# Patient Record
Sex: FEMALE | Race: WHITE | NOT HISPANIC OR LATINO
[De-identification: names, ages, dates, MRNs, and addresses within clinical notes are randomized per-mention and may not be internally consistent; named-entity substitution may affect disease eponyms.]

---

## 2017-07-27 ENCOUNTER — RX ONLY (RX ONLY)
Age: 57
End: 2017-07-27

## 2017-07-27 RX ORDER — TRETINOIN 1 MG/G
CREAM TOPICAL
Qty: 1 | Refills: 6 | Status: CANCELLED
Stop reason: DRUGHIGH

## 2017-07-27 RX ORDER — TRETINOIN 0.5 MG/G
CREAM TOPICAL
Qty: 1 | Refills: 6 | Status: ERX

## 2017-09-29 ENCOUNTER — APPOINTMENT (OUTPATIENT)
Dept: URBAN - METROPOLITAN AREA CLINIC 200 | Age: 57
Setting detail: DERMATOLOGY
End: 2017-10-02

## 2017-09-29 DIAGNOSIS — L57.8 OTHER SKIN CHANGES DUE TO CHRONIC EXPOSURE TO NONIONIZING RADIATION: ICD-10-CM

## 2017-09-29 PROCEDURE — 99213 OFFICE O/P EST LOW 20 MIN: CPT

## 2017-09-29 PROCEDURE — OTHER COUNSELING: OTHER

## 2017-09-29 ASSESSMENT — LOCATION DETAILED DESCRIPTION DERM: LOCATION DETAILED: RIGHT MEDIAL UPPER BACK

## 2017-09-29 ASSESSMENT — LOCATION SIMPLE DESCRIPTION DERM: LOCATION SIMPLE: RIGHT UPPER BACK

## 2017-09-29 ASSESSMENT — LOCATION ZONE DERM: LOCATION ZONE: TRUNK

## 2018-08-23 ENCOUNTER — APPOINTMENT (OUTPATIENT)
Dept: URBAN - METROPOLITAN AREA CLINIC 200 | Age: 58
Setting detail: DERMATOLOGY
End: 2018-09-07

## 2018-08-23 ENCOUNTER — RX ONLY (RX ONLY)
Age: 58
End: 2018-08-23

## 2018-08-23 DIAGNOSIS — L259 CONTACT DERMATITIS AND OTHER ECZEMA, UNSPECIFIED CAUSE: ICD-10-CM

## 2018-08-23 PROBLEM — L23.9 ALLERGIC CONTACT DERMATITIS, UNSPECIFIED CAUSE: Status: ACTIVE | Noted: 2018-08-23

## 2018-08-23 PROCEDURE — OTHER PRESCRIPTION: OTHER

## 2018-08-23 PROCEDURE — 99212 OFFICE O/P EST SF 10 MIN: CPT

## 2018-08-23 RX ORDER — METHYLPREDNISOLONE 4 MG/1
TABLET ORAL
Qty: 1 | Refills: 0

## 2018-08-23 RX ORDER — TRETINOIN 0.5 MG/G
CREAM TOPICAL
Qty: 1 | Refills: 6 | Status: ERX

## 2018-08-23 RX ORDER — TRIAMCINOLONE ACETONIDE 1 MG/G
CREAM TOPICAL
Qty: 1 | Refills: 3 | Status: ERX

## 2018-08-23 RX ORDER — TRETINOIN 0.5 MG/G
CREAM TOPICAL
Qty: 1 | Refills: 6

## 2018-08-23 ASSESSMENT — SEVERITY ASSESSMENT: SEVERITY: MILD TO MODERATE

## 2018-08-23 ASSESSMENT — LOCATION DETAILED DESCRIPTION DERM: LOCATION DETAILED: RIGHT INFERIOR TEMPLE

## 2018-08-23 ASSESSMENT — LOCATION ZONE DERM: LOCATION ZONE: FACE

## 2018-08-23 ASSESSMENT — LOCATION SIMPLE DESCRIPTION DERM: LOCATION SIMPLE: RIGHT TEMPLE

## 2018-10-04 ENCOUNTER — APPOINTMENT (OUTPATIENT)
Dept: URBAN - METROPOLITAN AREA CLINIC 200 | Age: 58
Setting detail: DERMATOLOGY
End: 2018-10-08

## 2018-10-04 DIAGNOSIS — L72.0 EPIDERMAL CYST: ICD-10-CM

## 2018-10-04 DIAGNOSIS — L57.8 OTHER SKIN CHANGES DUE TO CHRONIC EXPOSURE TO NONIONIZING RADIATION: ICD-10-CM

## 2018-10-04 PROCEDURE — OTHER REASSURANCE: OTHER

## 2018-10-04 PROCEDURE — OTHER COUNSELING: OTHER

## 2018-10-04 PROCEDURE — 99213 OFFICE O/P EST LOW 20 MIN: CPT

## 2018-10-04 ASSESSMENT — LOCATION SIMPLE DESCRIPTION DERM
LOCATION SIMPLE: CHEST
LOCATION SIMPLE: RIGHT BREAST

## 2018-10-04 ASSESSMENT — LOCATION DETAILED DESCRIPTION DERM
LOCATION DETAILED: LEFT MEDIAL SUPERIOR CHEST
LOCATION DETAILED: RIGHT INFRAMAMMARY CREASE (OUTER QUADRANT)

## 2018-10-04 ASSESSMENT — LOCATION ZONE DERM: LOCATION ZONE: TRUNK

## 2019-05-01 ENCOUNTER — RX ONLY (RX ONLY)
Age: 59
End: 2019-05-01

## 2019-05-01 RX ORDER — TRETINOIN 0.5 MG/G
CREAM TOPICAL
Qty: 1 | Refills: 6 | Status: ERX | COMMUNITY
Start: 2019-05-01

## 2019-05-31 ENCOUNTER — APPOINTMENT (OUTPATIENT)
Dept: URBAN - METROPOLITAN AREA CLINIC 200 | Age: 59
Setting detail: DERMATOLOGY
End: 2019-06-19

## 2019-05-31 DIAGNOSIS — L72.0 EPIDERMAL CYST: ICD-10-CM

## 2019-05-31 PROBLEM — L20.84 INTRINSIC (ALLERGIC) ECZEMA: Status: ACTIVE | Noted: 2019-05-31

## 2019-05-31 PROBLEM — L57.0 ACTINIC KERATOSIS: Status: ACTIVE | Noted: 2019-05-31

## 2019-05-31 PROCEDURE — OTHER INCISION AND DRAINAGE: OTHER

## 2019-05-31 PROCEDURE — 10060 I&D ABSCESS SIMPLE/SINGLE: CPT

## 2019-05-31 ASSESSMENT — LOCATION ZONE DERM: LOCATION ZONE: LEG

## 2019-05-31 ASSESSMENT — LOCATION SIMPLE DESCRIPTION DERM: LOCATION SIMPLE: LEFT THIGH

## 2019-05-31 ASSESSMENT — LOCATION DETAILED DESCRIPTION DERM: LOCATION DETAILED: LEFT ANTERIOR PROXIMAL THIGH

## 2019-05-31 NOTE — PROCEDURE: INCISION AND DRAINAGE
Suture Text: The incision was partially closed with
Wound Care: Vaseline
Render Postcare In Note?: No
Consent was obtained and risks were reviewed including but not limited to delayed wound healing, infection, need for multiple I and D's, and pain.
Lesion Type: Cyst
Post-Care Instructions: I reviewed with the patient in detail post-care instructions. Patient should keep wound covered and call the office should any redness, pain, swelling or worsening occur.
Preparation Text: The area was prepped in the usual clean fashion.
Epidermal Closure: simple interrupted
Detail Level: Detailed
Drainage Type?: cyst-like
Drainage Amount?: moderate
Epidermal Sutures: 5-0 Nylon
Size Of Lesion In Cm (Optional But May Be Required For Some Insurances): 0
Anesthesia Type: 1% lidocaine with 1:300,000 epinephrine
Dressing: Band-Aid
Anesthesia Volume In Cc: 0.1
Method: sterile needle

## 2019-10-10 ENCOUNTER — APPOINTMENT (OUTPATIENT)
Dept: URBAN - METROPOLITAN AREA CLINIC 200 | Age: 59
Setting detail: DERMATOLOGY
End: 2019-10-20

## 2019-10-10 DIAGNOSIS — L57.8 OTHER SKIN CHANGES DUE TO CHRONIC EXPOSURE TO NONIONIZING RADIATION: ICD-10-CM

## 2019-10-10 DIAGNOSIS — Z80.8 FAMILY HISTORY OF MALIGNANT NEOPLASM OF OTHER ORGANS OR SYSTEMS: ICD-10-CM

## 2019-10-10 DIAGNOSIS — L72.8 OTHER FOLLICULAR CYSTS OF THE SKIN AND SUBCUTANEOUS TISSUE: ICD-10-CM

## 2019-10-10 PROBLEM — D23.5 OTHER BENIGN NEOPLASM OF SKIN OF TRUNK: Status: ACTIVE | Noted: 2019-10-10

## 2019-10-10 PROCEDURE — OTHER MIPS QUALITY: OTHER

## 2019-10-10 PROCEDURE — 99213 OFFICE O/P EST LOW 20 MIN: CPT

## 2019-10-10 PROCEDURE — OTHER REASSURANCE: OTHER

## 2019-10-10 PROCEDURE — OTHER COUNSELING: OTHER

## 2019-10-10 ASSESSMENT — LOCATION DETAILED DESCRIPTION DERM
LOCATION DETAILED: LEFT MEDIAL SUPERIOR CHEST
LOCATION DETAILED: RIGHT RIB CAGE
LOCATION DETAILED: LEFT INFERIOR ANTERIOR NECK

## 2019-10-10 ASSESSMENT — LOCATION ZONE DERM
LOCATION ZONE: NECK
LOCATION ZONE: TRUNK

## 2019-10-10 ASSESSMENT — LOCATION SIMPLE DESCRIPTION DERM
LOCATION SIMPLE: CHEST
LOCATION SIMPLE: ABDOMEN
LOCATION SIMPLE: LEFT ANTERIOR NECK

## 2019-10-10 NOTE — PROCEDURE: MIPS QUALITY
Quality 110: Preventive Care And Screening: Influenza Immunization: Influenza Immunization not Administered for Documented Reasons.
Detail Level: Detailed
Quality 474: Zoster Vaccination Status: Shingrix vaccine was not administered for reasons documented by clinician (e.g. patient administered vaccine other than Shingrix, patient allergy or other medical reasons, patient declined or other patient reasons, vaccine not available or other system reasons)
Additional Notes: Shingles SHINGRIX vaccine not received due to it being on back order.
Additional Notes: Patient has not received flu shot, but plans to.

## 2019-10-10 NOTE — PROCEDURE: REASSURANCE
Detail Level: Detailed
Additional Notes (Optional): Recommended Patient get cyst drained in or around December
Hide Additional Notes?: No

## 2020-01-07 ENCOUNTER — APPOINTMENT (OUTPATIENT)
Dept: URBAN - METROPOLITAN AREA CLINIC 200 | Age: 60
Setting detail: DERMATOLOGY
End: 2020-01-16

## 2020-01-07 DIAGNOSIS — L72.0 EPIDERMAL CYST: ICD-10-CM

## 2020-01-07 PROCEDURE — OTHER INCISION AND DRAINAGE: OTHER

## 2020-01-07 PROCEDURE — 10060 I&D ABSCESS SIMPLE/SINGLE: CPT

## 2020-01-07 ASSESSMENT — LOCATION SIMPLE DESCRIPTION DERM
LOCATION SIMPLE: ABDOMEN
LOCATION SIMPLE: RIGHT UPPER BACK

## 2020-01-07 ASSESSMENT — LOCATION DETAILED DESCRIPTION DERM
LOCATION DETAILED: RIGHT RIB CAGE
LOCATION DETAILED: RIGHT SUPERIOR MEDIAL UPPER BACK

## 2020-01-07 ASSESSMENT — LOCATION ZONE DERM: LOCATION ZONE: TRUNK

## 2020-01-07 NOTE — PROCEDURE: INCISION AND DRAINAGE
Wound Care: Vaseline
Curette: No
Preparation Text: The area was prepped in the usual clean fashion.
Epidermal Sutures: 5-0 Nylon
Method: 15 blade
Drainage Type?: cyst-like
Drainage Amount?: minimal
Dressing: Band-Aid
Epidermal Closure: simple interrupted
Size Of Lesion In Cm (Optional But May Be Required For Some Insurances): 0
Suture Text: The incision was partially closed with
Drainage Amount?: moderate
Anesthesia Type: 1% lidocaine with 1:300,000 epinephrine
Post-Care Instructions: I reviewed with the patient in detail post-care instructions. Patient should keep wound covered and call the office should any redness, pain, swelling or worsening occur.
Lesion Type: Cyst
Anesthesia Volume In Cc: 0.1
Consent was obtained and risks were reviewed including but not limited to delayed wound healing, infection, need for multiple I and D's, and pain.
Detail Level: Detailed

## 2020-04-23 ENCOUNTER — RX ONLY (RX ONLY)
Age: 60
End: 2020-04-23

## 2020-04-23 RX ORDER — TRETIONIN 0.5 MG/G
CREAM TOPICAL
Qty: 1 | Refills: 6 | Status: ERX

## 2020-10-21 ENCOUNTER — APPOINTMENT (OUTPATIENT)
Dept: URBAN - METROPOLITAN AREA CLINIC 200 | Age: 60
Setting detail: DERMATOLOGY
End: 2020-11-01

## 2020-10-21 DIAGNOSIS — Z80.8 FAMILY HISTORY OF MALIGNANT NEOPLASM OF OTHER ORGANS OR SYSTEMS: ICD-10-CM

## 2020-10-21 DIAGNOSIS — L57.8 OTHER SKIN CHANGES DUE TO CHRONIC EXPOSURE TO NONIONIZING RADIATION: ICD-10-CM

## 2020-10-21 PROBLEM — D23.39 OTHER BENIGN NEOPLASM OF SKIN OF OTHER PARTS OF FACE: Status: ACTIVE | Noted: 2020-10-21

## 2020-10-21 PROCEDURE — OTHER MIPS QUALITY: OTHER

## 2020-10-21 PROCEDURE — 99213 OFFICE O/P EST LOW 20 MIN: CPT

## 2020-10-21 PROCEDURE — OTHER COUNSELING: OTHER

## 2020-10-21 PROCEDURE — OTHER REASSURANCE: OTHER

## 2020-10-21 ASSESSMENT — LOCATION SIMPLE DESCRIPTION DERM
LOCATION SIMPLE: LEFT ANTERIOR NECK
LOCATION SIMPLE: CHEST

## 2020-10-21 ASSESSMENT — LOCATION ZONE DERM
LOCATION ZONE: NECK
LOCATION ZONE: TRUNK

## 2020-10-21 ASSESSMENT — LOCATION DETAILED DESCRIPTION DERM
LOCATION DETAILED: LEFT INFERIOR ANTERIOR NECK
LOCATION DETAILED: LEFT MEDIAL SUPERIOR CHEST

## 2021-01-29 ENCOUNTER — APPOINTMENT (OUTPATIENT)
Dept: URBAN - METROPOLITAN AREA CLINIC 200 | Age: 61
Setting detail: DERMATOLOGY
End: 2021-02-01

## 2021-01-29 DIAGNOSIS — L72.0 EPIDERMAL CYST: ICD-10-CM

## 2021-01-29 DIAGNOSIS — L73.8 OTHER SPECIFIED FOLLICULAR DISORDERS: ICD-10-CM

## 2021-01-29 DIAGNOSIS — L82.1 OTHER SEBORRHEIC KERATOSIS: ICD-10-CM

## 2021-01-29 PROBLEM — D48.5 NEOPLASM OF UNCERTAIN BEHAVIOR OF SKIN: Status: ACTIVE | Noted: 2021-01-29

## 2021-01-29 PROCEDURE — OTHER EXCISION (NO PATHOLOGY): OTHER

## 2021-01-29 PROCEDURE — 11403 EXC TR-EXT B9+MARG 2.1-3CM: CPT

## 2021-01-29 PROCEDURE — OTHER BENIGN DESTRUCTION COSMETIC: OTHER

## 2021-01-29 ASSESSMENT — LOCATION DETAILED DESCRIPTION DERM
LOCATION DETAILED: LEFT UPPER CUTANEOUS LIP
LOCATION DETAILED: LEFT ANTERIOR PROXIMAL THIGH
LOCATION DETAILED: LEFT NASAL ALA

## 2021-01-29 ASSESSMENT — LOCATION ZONE DERM
LOCATION ZONE: NOSE
LOCATION ZONE: LEG
LOCATION ZONE: LIP

## 2021-01-29 ASSESSMENT — LOCATION SIMPLE DESCRIPTION DERM
LOCATION SIMPLE: LEFT NOSE
LOCATION SIMPLE: LEFT LIP
LOCATION SIMPLE: LEFT THIGH

## 2021-01-29 NOTE — PROCEDURE: BENIGN DESTRUCTION COSMETIC
Post-Care Instructions: I reviewed with the patient in detail post-care instructions. Patient is to wear sunprotection, and avoid picking at any of the treated lesions. Pt may apply Vaseline to crusted or scabbing areas.
Price (Use Numbers Only, No Special Characters Or $): 50.00
Anesthesia Volume In Cc: 0
Detail Level: Zone
Consent: The patient's consent was obtained including but not limited to risks of crusting, scabbing, blistering, scarring, darker or lighter pigmentary change, recurrence, incomplete removal and infection.

## 2021-01-29 NOTE — PROCEDURE: EXCISION (NO PATHOLOGY)
Scalpel Size: 15 blade
Complex Requirements: Involvement Of Free Margin?: No
Size Of Lesion In Cm: 2
Hemostasis: Pressure
Intermediate Repair Preamble Text (Leave Blank If You Do Not Want): Undermining was performed with blunt dissection.
Anesthesia Type: 1% lidocaine with epinephrine
Dressing: dry sterile dressing
Slit Excision Additional Text (Leave Blank If You Do Not Want): A linear line was drawn on the skin overlying the lesion. An incision was made slowly until the lesion was visualized.  Once visualized, the lesion was removed with blunt dissection.
Deep Sutures: 4-0 Vicryl
Primary Defect Width (In Cm): 0
Complex Repair Preamble Text (Leave Blank If You Do Not Want): Extensive wide undermining was performed.
Use Complex Repair Preambles?: Yes
Estimated Blood Loss (Cc): minimal
Size Of Margin In Cm: 0.2
Saucerization Excision Additional Text (Leave Blank If You Do Not Want): The margin was drawn around the clinically apparent lesion.  Incisions were then made along these lines, in a tangential fashion, to the appropriate tissue plane and the lesion was extirpated.
Suture Removal: 14 days
Epidermal Closure: simple interrupted
Medical Necessity Clause: This procedure was medically necessary because the lesion that was treated was:
Eliptical Excision Additional Text (Leave Blank If You Do Not Want): The margin was drawn around the clinically apparent lesion.  An elliptical shape was then drawn on the skin incorporating the lesion and margins.  Incisions were then made along these lines to the appropriate tissue plane and the lesion was extirpated.
Helical Rim Text: The closure involved the helical rim.
Epidermal Sutures: 4-0 Nylon
Excision Depth: adipose tissue
Fusiform Excision Additional Text (Leave Blank If You Do Not Want): The margin was drawn around the clinically apparent lesion.  A fusiform shape was then drawn on the skin incorporating the lesion and margins.  Incisions were then made along these lines to the appropriate tissue plane and the lesion was extirpated.
Undermining Type: Entire Wound
Wound Care: Vaseline
Retention Suture Text: Retention sutures were placed to support the closure and prevent dehiscence.
Consent was obtained from the patient. The risks and benefits to therapy were discussed in detail. Specifically, the risks of infection, scarring, bleeding, prolonged wound healing, incomplete removal, allergy to anesthesia, nerve injury and recurrence were addressed. Prior to the procedure, the treatment site was clearly identified and confirmed by the patient. All components of Universal Protocol/PAUSE Rule completed.
Debridement Text: The wound edges were debrided prior to proceeding with the closure to facilitate wound healing.
Detail Level: Detailed
Excision Method: Slit
Additional Anesthesia Volume In Cc: 6
Repair Type: None
Nostril Rim Text: The closure involved the nostril rim.
Medical Necessity Information: It is in your best interest to select a reason for this procedure from the list below. All of these items fulfill various CMS LCD requirements except lesion extends to a margin.
Vermilion Border Text: The closure involved the vermilion border.
Post-Care Instructions: I reviewed with the patient in detail post-care instructions. Patient is not to engage in any heavy lifting, exercise, or swimming for the next 14 days. Should the patient develop any fevers, chills, bleeding, severe pain patient will contact the office immediately.

## 2021-04-16 ENCOUNTER — RX ONLY (RX ONLY)
Age: 61
End: 2021-04-16

## 2021-04-16 RX ORDER — TRETIONIN 0.5 MG/G
CREAM TOPICAL
Qty: 1 | Refills: 8 | Status: ERX

## 2021-11-11 ENCOUNTER — APPOINTMENT (OUTPATIENT)
Dept: URBAN - METROPOLITAN AREA CLINIC 200 | Age: 61
Setting detail: DERMATOLOGY
End: 2021-11-18

## 2021-11-11 DIAGNOSIS — L57.8 OTHER SKIN CHANGES DUE TO CHRONIC EXPOSURE TO NONIONIZING RADIATION: ICD-10-CM

## 2021-11-11 DIAGNOSIS — Z11.52 ENCOUNTER FOR SCREENING FOR COVID-19: ICD-10-CM

## 2021-11-11 DIAGNOSIS — D22 MELANOCYTIC NEVI: ICD-10-CM

## 2021-11-11 DIAGNOSIS — Z80.8 FAMILY HISTORY OF MALIGNANT NEOPLASM OF OTHER ORGANS OR SYSTEMS: ICD-10-CM

## 2021-11-11 PROBLEM — D22.39 MELANOCYTIC NEVI OF OTHER PARTS OF FACE: Status: ACTIVE | Noted: 2021-11-11

## 2021-11-11 PROCEDURE — OTHER REFERRAL: OTHER

## 2021-11-11 PROCEDURE — OTHER COUNSELING: OTHER

## 2021-11-11 PROCEDURE — OTHER MIPS QUALITY: OTHER

## 2021-11-11 PROCEDURE — 99213 OFFICE O/P EST LOW 20 MIN: CPT

## 2021-11-11 PROCEDURE — OTHER SUNSCREEN RECOMMENDATIONS: OTHER

## 2021-11-11 PROCEDURE — OTHER SCREENING FOR COVID-19: OTHER

## 2021-11-11 ASSESSMENT — LOCATION ZONE DERM
LOCATION ZONE: NECK
LOCATION ZONE: FACE
LOCATION ZONE: TRUNK

## 2021-11-11 ASSESSMENT — LOCATION DETAILED DESCRIPTION DERM
LOCATION DETAILED: LEFT CHIN
LOCATION DETAILED: LEFT INFERIOR ANTERIOR NECK
LOCATION DETAILED: PERIUMBILICAL SKIN

## 2021-11-11 ASSESSMENT — LOCATION SIMPLE DESCRIPTION DERM
LOCATION SIMPLE: LEFT ANTERIOR NECK
LOCATION SIMPLE: ABDOMEN
LOCATION SIMPLE: CHIN

## 2023-02-16 ENCOUNTER — APPOINTMENT (OUTPATIENT)
Dept: URBAN - METROPOLITAN AREA CLINIC 203 | Age: 63
Setting detail: DERMATOLOGY
End: 2023-02-16

## 2023-02-16 DIAGNOSIS — L72.0 EPIDERMAL CYST: ICD-10-CM

## 2023-02-16 DIAGNOSIS — L57.8 OTHER SKIN CHANGES DUE TO CHRONIC EXPOSURE TO NONIONIZING RADIATION: ICD-10-CM

## 2023-02-16 DIAGNOSIS — D485 NEOPLASM OF UNCERTAIN BEHAVIOR OF SKIN: ICD-10-CM

## 2023-02-16 DIAGNOSIS — L82.1 OTHER SEBORRHEIC KERATOSIS: ICD-10-CM

## 2023-02-16 DIAGNOSIS — L30.4 ERYTHEMA INTERTRIGO: ICD-10-CM

## 2023-02-16 DIAGNOSIS — Z80.8 FAMILY HISTORY OF MALIGNANT NEOPLASM OF OTHER ORGANS OR SYSTEMS: ICD-10-CM

## 2023-02-16 PROBLEM — D48.5 NEOPLASM OF UNCERTAIN BEHAVIOR OF SKIN: Status: ACTIVE | Noted: 2023-02-16

## 2023-02-16 PROCEDURE — OTHER LIQUID NITROGEN (COSMETIC): OTHER

## 2023-02-16 PROCEDURE — OTHER BIOPSY BY SHAVE METHOD: OTHER

## 2023-02-16 PROCEDURE — OTHER COUNSELING: OTHER

## 2023-02-16 PROCEDURE — OTHER SUNSCREEN RECOMMENDATIONS: OTHER

## 2023-02-16 PROCEDURE — OTHER PRESCRIPTION MEDICATION MANAGEMENT: OTHER

## 2023-02-16 PROCEDURE — OTHER PRESCRIPTION: OTHER

## 2023-02-16 PROCEDURE — 99213 OFFICE O/P EST LOW 20 MIN: CPT | Mod: 25

## 2023-02-16 PROCEDURE — 11102 TANGNTL BX SKIN SINGLE LES: CPT

## 2023-02-16 PROCEDURE — OTHER REASSURANCE: OTHER

## 2023-02-16 PROCEDURE — OTHER PHOTO-DOCUMENTATION: OTHER

## 2023-02-16 PROCEDURE — OTHER DEFER: OTHER

## 2023-02-16 RX ORDER — NYSTATIN AND TRIAMCINOLONE ACETONIDE 100000; 1 [USP'U]/G; MG/G
CREAM TOPICAL
Qty: 60 | Refills: 4 | Status: ERX | COMMUNITY
Start: 2023-02-16

## 2023-02-16 ASSESSMENT — LOCATION SIMPLE DESCRIPTION DERM
LOCATION SIMPLE: LEFT UPPER BACK
LOCATION SIMPLE: ABDOMEN
LOCATION SIMPLE: LEFT BREAST
LOCATION SIMPLE: NECK

## 2023-02-16 ASSESSMENT — LOCATION DETAILED DESCRIPTION DERM
LOCATION DETAILED: LEFT MEDIAL UPPER BACK
LOCATION DETAILED: LEFT CENTRAL LATERAL NECK
LOCATION DETAILED: LEFT MEDIAL BREAST 10-11:00 REGION
LOCATION DETAILED: PERIUMBILICAL SKIN
LOCATION DETAILED: LEFT MEDIAL BREAST 11-12:00 REGION

## 2023-02-16 ASSESSMENT — LOCATION ZONE DERM
LOCATION ZONE: TRUNK
LOCATION ZONE: NECK

## 2023-02-16 ASSESSMENT — PAIN INTENSITY VAS: HOW INTENSE IS YOUR PAIN 0 BEING NO PAIN, 10 BEING THE MOST SEVERE PAIN POSSIBLE?: NO PAIN

## 2023-02-16 NOTE — PROCEDURE: DEFER
Introduction Text (Please End With A Colon): The following procedure was deferred:
Instructions (Optional): Pt will call if she decides to have  4mm punch biopsy done
Size Of Lesion In Cm (Optional): 0
Detail Level: Detailed

## 2023-02-16 NOTE — PROCEDURE: LIQUID NITROGEN (COSMETIC)
Show Spray Paint Technique Variable?: Yes
Render Post-Care Instructions In Note?: no
Billing Information: Bill by Static Price
Consent: The patient's consent was obtained including but not limited to risks of crusting, scabbing, blistering, scarring, darker or lighter pigmentary change, recurrence, incomplete removal and infection. The patient understands that the procedure is cosmetic in nature and is not covered by insurance.
Post-Care Instructions: I reviewed with the patient in detail post-care instructions. Patient is to wear sunprotection, and avoid picking at any of the treated lesions. Pt may apply Vaseline to crusted or scabbing areas.
Spray Paint Text: The liquid nitrogen was applied to the skin utilizing a spray paint frosting technique.
Detail Level: Detailed

## 2023-02-16 NOTE — PROCEDURE: PRESCRIPTION MEDICATION MANAGEMENT
Plan: Pt notified insurance may not cover. Pt was ok with knowing ins may not pay
Render In Strict Bullet Format?: No
Detail Level: Zone

## 2023-02-20 ENCOUNTER — RX ONLY (RX ONLY)
Age: 63
End: 2023-02-20

## 2023-02-20 RX ORDER — TRETIONIN 0.5 MG/G
CREAM TOPICAL QHS
Qty: 45 | Refills: 2 | Status: ERX

## 2023-03-06 ENCOUNTER — APPOINTMENT (OUTPATIENT)
Dept: URBAN - METROPOLITAN AREA CLINIC 203 | Age: 63
Setting detail: DERMATOLOGY
End: 2023-03-06

## 2023-03-06 PROBLEM — D03.59 MELANOMA IN SITU OF OTHER PART OF TRUNK: Status: ACTIVE | Noted: 2023-03-06

## 2023-03-06 PROCEDURE — OTHER REFERRAL: OTHER

## 2023-03-08 ENCOUNTER — OFFICE VISIT (OUTPATIENT)
Dept: SURGERY | Facility: CLINIC | Age: 63
End: 2023-03-08
Payer: COMMERCIAL

## 2023-03-08 VITALS — HEIGHT: 64 IN | WEIGHT: 110 LBS | BODY MASS INDEX: 18.78 KG/M2

## 2023-03-08 DIAGNOSIS — D03.59 MELANOMA IN SITU OF TORSO EXCLUDING BREAST (CMS/HCC): Primary | ICD-10-CM

## 2023-03-08 PROCEDURE — 3008F BODY MASS INDEX DOCD: CPT | Performed by: PLASTIC SURGERY

## 2023-03-08 PROCEDURE — 99203 OFFICE O/P NEW LOW 30 MIN: CPT | Performed by: PLASTIC SURGERY

## 2023-03-08 ASSESSMENT — ENCOUNTER SYMPTOMS
CONSTITUTIONAL NEGATIVE: 1
CARDIOVASCULAR NEGATIVE: 1

## 2023-03-08 NOTE — PROGRESS NOTES
"DETAILS OF NEW PATIENT CONSULTATION     Consulting Service:  [facility]     Referring Physician: Janette Duffy MD    Reason for Consultation:   Chief Complaint   Patient presents with   • Consult     MIS left upper back       PCP: Liz Cristina MD   HISTORY OF PRESENT ILLNESS     Elena Palmer is a 62 y.o. female actively treated for left upper back melanoma in situ.       PAST MEDICAL AND SURGICAL HISTORY     History reviewed. No pertinent past medical history.    Past Surgical History:   Procedure Laterality Date   • CEREBRAL ANEURYSM REPAIR N/A 03/2018   • CEREBRAL ANGIOGRAM     • SPINAL FUSION N/A 1976    scoliosis          MEDICATIONS     No current outpatient medications on file prior to visit.     No current facility-administered medications on file prior to visit.          ALLERGIES     Azithromycin, Ceclor [cefaclor], and Clindamycin     SOCIAL HISTORY     Social History     Tobacco Use   • Smoking status: Never   • Smokeless tobacco: Never   Vaping Use   • Vaping status: Never Used   Substance Use Topics   • Alcohol use: Yes     Alcohol/week: 2.0 standard drinks of alcohol     Types: 2 Glasses of wine per week     Comment: 2 glasses/week   • Drug use: Never          REVIEW OF SYSTEMS   Review of Systems    Review of Systems   Constitutional: Negative.    HENT: Negative.    Cardiovascular: Negative.            PHYSICAL EXAMINATION   Visit Vitals  Ht 1.626 m (5' 4\")   Wt 49.9 kg (110 lb)   LMP  (LMP Unknown)   BMI 18.88 kg/m²      Body mass index is 18.88 kg/m².    Physical Exam  Constitutional:       Appearance: Normal appearance.   HENT:      Head: Normocephalic and atraumatic.   Pulmonary:      Effort: Pulmonary effort is normal.   Skin:     General: Skin is warm and dry.      Comments: Healing biopsy site left upper back   Neurological:      Mental Status: She is alert.              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  Risks and benefits of the left procedure explained to the patient in detail.  Multiple questions asked and answered.  Informed consent obtained.      Prashant Dias MD  3/8/2023     Thank you very much for allowing us to participate in the care of your patient. Please do not hesitate to call or email if there are any questions.

## 2023-03-08 NOTE — ASSESSMENT & PLAN NOTE
Patient with biopsy-proven melanoma in situ of the left upper back.  Risks and benefits of the left procedure explained to the patient in detail.  Multiple questions asked and answered.  Informed consent obtained.

## 2023-03-08 NOTE — H&P (VIEW-ONLY)
"DETAILS OF NEW PATIENT CONSULTATION     Consulting Service:  [facility]     Referring Physician: Janette Duffy MD    Reason for Consultation:   Chief Complaint   Patient presents with   • Consult     MIS left upper back       PCP: Liz Cristina MD   HISTORY OF PRESENT ILLNESS     Elena Palmer is a 62 y.o. female actively treated for left upper back melanoma in situ.       PAST MEDICAL AND SURGICAL HISTORY     History reviewed. No pertinent past medical history.    Past Surgical History:   Procedure Laterality Date   • CEREBRAL ANEURYSM REPAIR N/A 03/2018   • CEREBRAL ANGIOGRAM     • SPINAL FUSION N/A 1976    scoliosis          MEDICATIONS     No current outpatient medications on file prior to visit.     No current facility-administered medications on file prior to visit.          ALLERGIES     Azithromycin, Ceclor [cefaclor], and Clindamycin     SOCIAL HISTORY     Social History     Tobacco Use   • Smoking status: Never   • Smokeless tobacco: Never   Vaping Use   • Vaping status: Never Used   Substance Use Topics   • Alcohol use: Yes     Alcohol/week: 2.0 standard drinks of alcohol     Types: 2 Glasses of wine per week     Comment: 2 glasses/week   • Drug use: Never          REVIEW OF SYSTEMS   Review of Systems    Review of Systems   Constitutional: Negative.    HENT: Negative.    Cardiovascular: Negative.            PHYSICAL EXAMINATION   Visit Vitals  Ht 1.626 m (5' 4\")   Wt 49.9 kg (110 lb)   LMP  (LMP Unknown)   BMI 18.88 kg/m²      Body mass index is 18.88 kg/m².    Physical Exam  Constitutional:       Appearance: Normal appearance.   HENT:      Head: Normocephalic and atraumatic.   Pulmonary:      Effort: Pulmonary effort is normal.   Skin:     General: Skin is warm and dry.      Comments: Healing biopsy site left upper back   Neurological:      Mental Status: She is alert.              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  Risks and benefits of the left procedure explained to the patient in detail.  Multiple questions asked and answered.  Informed consent obtained.      Prashant Dias MD  3/8/2023     Thank you very much for allowing us to participate in the care of your patient. Please do not hesitate to call or email if there are any questions.

## 2023-03-13 ENCOUNTER — HOSPITAL ENCOUNTER (OUTPATIENT)
Facility: HOSPITAL | Age: 63
Setting detail: HOSPITAL OUTPATIENT SURGERY
Discharge: HOME | End: 2023-03-13
Attending: PLASTIC SURGERY | Admitting: PLASTIC SURGERY
Payer: COMMERCIAL

## 2023-03-13 VITALS — SYSTOLIC BLOOD PRESSURE: 145 MMHG | OXYGEN SATURATION: 100 % | DIASTOLIC BLOOD PRESSURE: 69 MMHG | HEART RATE: 92 BPM

## 2023-03-13 DIAGNOSIS — D03.59 MELANOMA IN SITU OF TORSO EXCLUDING BREAST (CMS/HCC): ICD-10-CM

## 2023-03-13 PROCEDURE — 36000002 HC OR LEVEL 2 INITIAL 30MIN: Performed by: PLASTIC SURGERY

## 2023-03-13 PROCEDURE — 88305 TISSUE EXAM BY PATHOLOGIST: CPT | Performed by: PLASTIC SURGERY

## 2023-03-13 PROCEDURE — 0HB6XZZ EXCISION OF BACK SKIN, EXTERNAL APPROACH: ICD-10-PCS | Performed by: PLASTIC SURGERY

## 2023-03-13 PROCEDURE — 13101 CMPLX RPR TRUNK 2.6-7.5 CM: CPT | Mod: XU | Performed by: PLASTIC SURGERY

## 2023-03-13 PROCEDURE — 11604 EXC TR-EXT MAL+MARG 3.1-4 CM: CPT | Performed by: PLASTIC SURGERY

## 2023-03-13 PROCEDURE — 36000012 HC OR LEVEL 2 EA ADDL MIN: Performed by: PLASTIC SURGERY

## 2023-03-13 PROCEDURE — 27200000 HC STERILE SUPPLY: Performed by: PLASTIC SURGERY

## 2023-03-13 PROCEDURE — 25000000 HC PHARMACY GENERAL: Performed by: PLASTIC SURGERY

## 2023-03-13 RX ORDER — LIDOCAINE HYDROCHLORIDE AND EPINEPHRINE 10; 10 UG/ML; MG/ML
INJECTION, SOLUTION INFILTRATION; PERINEURAL
Status: DISCONTINUED | OUTPATIENT
Start: 2023-03-13 | End: 2023-03-13 | Stop reason: HOSPADM

## 2023-03-13 NOTE — OR SURGEON
Pre-Procedure patient identification:  I am the primary operating surgeon/proceduralist and I have identified the patient and confirmed laterality is left on 03/13/23 at 2:24 PM Prashant Dias MD  Phone Number: [phone]

## 2023-03-13 NOTE — DISCHARGE INSTRUCTIONS
Keep dry for two days then you may get wet. Cover incision with antibiotic ointment and gauze. See Dr. Dias in 10-14 days.

## 2023-03-14 NOTE — OP NOTE
Preop diagnosis: Melanoma in situ of the left upper back    Postop diagnosis: Same    Procedure: Wide local excision melanoma in situ left upper back with 5 mm margins total excision 3.2 cm complex closure 4 cm    Surgeon: Prashant Dias MD    Anesthesia: Local    Indication: Patient is a 62-year-old woman who presents with a biopsy-proven melanoma in situ of her left upper back.  Risks and benefits of an operative procedure were explained to the patient in detail.  Multiple questions were asked and answered.  Informed consent was obtained.    Procedure in detail: Patient taken the operating placed prone on the operating table.  Photography and the patient was used to identify the lesion lesion was marked.  Local anesthesia was infiltrated then she was carefully prepped and draped in sterile manner.  Lesion was excised and sent block pathology with a suture marking the superior margin.  This created a significant defect therefore the wound edges were widely undermined hemostasis achieved electrocautery.  Deep layers were closed with 3-0 Monocryl superficial skin with a 4-0 Prolene.  Estimated blood loss was minimal.  Specimen was a left upper back melanoma in situ.  There are no apparent complications.    Prashant Dias MD      Synoptic Operative Report - Primary Cutaneous Melanoma    1. Original Breslow thickness of the lesion: Melanoma in situ (MIS).    2.  Clinical margin from the edge of the lesion or the prior excision scar: 0.5 cm    3.  Depth was down to the fascia.    Synoptic Report complete

## 2023-03-16 LAB
CASE RPRT: NORMAL
CLINICAL INFO: NORMAL
IMMUNE STAIN STUDY: NORMAL
PATH REPORT.FINAL DX SPEC: NORMAL
PATH REPORT.FINAL DX SPEC: NORMAL
PATH REPORT.GROSS SPEC: NORMAL

## 2023-03-22 ENCOUNTER — OFFICE VISIT (OUTPATIENT)
Dept: SURGERY | Facility: CLINIC | Age: 63
End: 2023-03-22
Payer: COMMERCIAL

## 2023-03-22 VITALS — BODY MASS INDEX: 18.88 KG/M2 | WEIGHT: 110 LBS

## 2023-03-22 DIAGNOSIS — D03.59 MELANOMA IN SITU OF TORSO EXCLUDING BREAST (CMS/HCC): Primary | ICD-10-CM

## 2023-03-22 PROCEDURE — 99024 POSTOP FOLLOW-UP VISIT: CPT | Performed by: PLASTIC SURGERY

## 2023-03-22 NOTE — LETTER
March 22, 2023     Liz Cristina MD  [address]  [address]  [facility] [address]    Patient: Elena Palmer  YOB: 1960  Date of Visit: 3/22/2023      Dear Dr. Cristina:    Thank you for referring Elena Palmer to me for evaluation. Below are my notes for this consultation.    If you have questions, please do not hesitate to call me. I look forward to following your patient along with you.         Sincerely,        Prashant Dias MD        CC: MD Arun Hassan Sean A, MD  3/22/2023 10:18 AM  Signed  DETAILS OF POST OPERATIVE VISIT     Consulting Service:  [facility]     Referring Physician: Liz Cristina MD    Reason for Consultation:   Chief Complaint   Patient presents with   • Post-op     Post exc left upper back MIS from 3/13/2023       PCP: Liz Cristina MD   HISTORY OF PRESENT ILLNESS     Elena Palmer is a 62 y.o. female here for a post-operative appointment from 1 week ago.       PAST MEDICAL AND SURGICAL HISTORY     Past Medical History:   Diagnosis Date   • Melanoma in situ of back (CMS/HCC) 03/13/2023       Past Surgical History:   Procedure Laterality Date   • CEREBRAL ANEURYSM REPAIR N/A 03/2018   • CEREBRAL ANGIOGRAM     • SKIN CANCER EXCISION N/A 03/13/2023    MIS left upper back   • SPINAL FUSION N/A 1976    scoliosis            MEDICATIONS     No current outpatient medications on file prior to visit.     No current facility-administered medications on file prior to visit.          ALLERGIES     Azithromycin, Ceclor [cefaclor], and Clindamycin     PHYSICAL EXAMINATION   Visit Vitals  Wt 49.9 kg (110 lb)   LMP  (LMP Unknown)   BMI 18.88 kg/m²      Body mass index is 18.88 kg/m².       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  Scar care discussed.  Skin checks  with dermatology.    Final Diagnosis   Date/Time Value Ref Range Status   03/13/2023 02:22 PM   Final    Skin, left upper back, excision:  No residual melanocytic proliferation identified.  Dermal scar.        **ATTENTION PATIENTS**  **The findings in this report have been made available for review potentially before your provider has had a chance to review and discuss the results with you.  Please allow time for your provider to review your results.  If you have any questions or concerns about these results, please contact the healthcare provider who ordered the test.**               Prashant Dias MD  3/22/2023     Thank you very much for allowing us to participate in the care of your patient. Please do not hesitate to call or email if there are any questions.

## 2023-03-22 NOTE — PROGRESS NOTES
DETAILS OF POST OPERATIVE VISIT     Consulting Service:  [facility]     Referring Physician: Liz rCistina MD    Reason for Consultation:   Chief Complaint   Patient presents with   • Post-op     Post exc left upper back MIS from 3/13/2023       PCP: Liz Cristina MD   HISTORY OF PRESENT ILLNESS     Elena Palmer is a 62 y.o. female here for a post-operative appointment from 1 week ago.       PAST MEDICAL AND SURGICAL HISTORY     Past Medical History:   Diagnosis Date   • Melanoma in situ of back (CMS/HCC) 03/13/2023       Past Surgical History:   Procedure Laterality Date   • CEREBRAL ANEURYSM REPAIR N/A 03/2018   • CEREBRAL ANGIOGRAM     • SKIN CANCER EXCISION N/A 03/13/2023    MIS left upper back   • SPINAL FUSION N/A 1976    scoliosis            MEDICATIONS     No current outpatient medications on file prior to visit.     No current facility-administered medications on file prior to visit.          ALLERGIES     Azithromycin, Ceclor [cefaclor], and Clindamycin     PHYSICAL EXAMINATION   Visit Vitals  Wt 49.9 kg (110 lb)   LMP  (LMP Unknown)   BMI 18.88 kg/m²      Body mass index is 18.88 kg/m².       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  Scar care discussed.  Skin checks with dermatology.    Final Diagnosis   Date/Time Value Ref Range Status   03/13/2023 02:22 PM   Final    Skin, left upper back, excision:  No residual melanocytic proliferation identified.  Dermal scar.        **ATTENTION PATIENTS**  **The findings in this report have been made available for review potentially before your provider has had a chance to review and discuss the results with you.  Please allow time for your provider to review your results.  If you have any questions or concerns about these results, please contact the healthcare  provider who ordered the test.**               Prashant Dias MD  3/22/2023     Thank you very much for allowing us to participate in the care of your patient. Please do not hesitate to call or email if there are any questions.

## 2023-03-22 NOTE — ASSESSMENT & PLAN NOTE
Sutures removed, healing nicely, pathology reviewed with patient, area cleansed with alcohol, and Steri-Strips placed.  Scar care discussed.  Skin checks with dermatology.    Final Diagnosis   Date/Time Value Ref Range Status   03/13/2023 02:22 PM   Final    Skin, left upper back, excision:  No residual melanocytic proliferation identified.  Dermal scar.        **ATTENTION PATIENTS**  **The findings in this report have been made available for review potentially before your provider has had a chance to review and discuss the results with you.  Please allow time for your provider to review your results.  If you have any questions or concerns about these results, please contact the healthcare provider who ordered the test.**

## 2023-08-17 ENCOUNTER — APPOINTMENT (OUTPATIENT)
Dept: URBAN - METROPOLITAN AREA CLINIC 203 | Age: 63
Setting detail: DERMATOLOGY
End: 2023-08-21

## 2023-08-17 DIAGNOSIS — Z80.8 FAMILY HISTORY OF MALIGNANT NEOPLASM OF OTHER ORGANS OR SYSTEMS: ICD-10-CM

## 2023-08-17 DIAGNOSIS — Z86.006 PERSONAL HISTORY OF MELANOMA IN-SITU: ICD-10-CM

## 2023-08-17 DIAGNOSIS — L57.8 OTHER SKIN CHANGES DUE TO CHRONIC EXPOSURE TO NONIONIZING RADIATION: ICD-10-CM

## 2023-08-17 DIAGNOSIS — L82.0 INFLAMED SEBORRHEIC KERATOSIS: ICD-10-CM

## 2023-08-17 PROCEDURE — 17110 DESTRUCT B9 LESION 1-14: CPT

## 2023-08-17 PROCEDURE — 99213 OFFICE O/P EST LOW 20 MIN: CPT | Mod: 25

## 2023-08-17 PROCEDURE — OTHER SUNSCREEN RECOMMENDATIONS: OTHER

## 2023-08-17 PROCEDURE — OTHER LIQUID NITROGEN: OTHER

## 2023-08-17 PROCEDURE — OTHER COUNSELING: OTHER

## 2023-08-17 ASSESSMENT — LOCATION DETAILED DESCRIPTION DERM
LOCATION DETAILED: LEFT MEDIAL BREAST 10-11:00 REGION
LOCATION DETAILED: LEFT MEDIAL BREAST 11-12:00 REGION
LOCATION DETAILED: RIGHT UPPER CUTANEOUS LIP
LOCATION DETAILED: PERIUMBILICAL SKIN

## 2023-08-17 ASSESSMENT — LOCATION SIMPLE DESCRIPTION DERM
LOCATION SIMPLE: LEFT BREAST
LOCATION SIMPLE: ABDOMEN
LOCATION SIMPLE: RIGHT LIP

## 2023-08-17 ASSESSMENT — LOCATION ZONE DERM
LOCATION ZONE: LIP
LOCATION ZONE: TRUNK

## 2023-08-17 NOTE — PROCEDURE: LIQUID NITROGEN
Include Z78.9 (Other Specified Conditions Influencing Health Status) As An Associated Diagnosis?: No
Consent: The patient's consent was obtained including but not limited to risks of crusting, scabbing, blistering, scarring, darker or lighter pigmentary change, recurrence, incomplete removal and infection.
Detail Level: Detailed
Post-Care Instructions: I reviewed with the patient in detail post-care instructions. Patient is to wear sunprotection, and avoid picking at any of the treated lesions. Pt may apply Vaseline to crusted or scabbing areas.
Show Spray Paint Technique Variable?: Yes
Spray Paint Text: The liquid nitrogen was applied to the skin utilizing a spray paint frosting technique.
Medical Necessity Clause: This procedure was medically necessary because the lesions that were treated were:
Medical Necessity Information: It is in your best interest to select a reason for this procedure from the list below. All of these items fulfill various CMS LCD requirements except the new and changing color options.

## 2024-02-19 ENCOUNTER — APPOINTMENT (OUTPATIENT)
Dept: URBAN - METROPOLITAN AREA CLINIC 203 | Age: 64
Setting detail: DERMATOLOGY
End: 2024-02-26

## 2024-02-19 DIAGNOSIS — Z85.820 PERSONAL HISTORY OF MALIGNANT MELANOMA OF SKIN: ICD-10-CM

## 2024-02-19 DIAGNOSIS — Z80.8 FAMILY HISTORY OF MALIGNANT NEOPLASM OF OTHER ORGANS OR SYSTEMS: ICD-10-CM

## 2024-02-19 DIAGNOSIS — L81.4 OTHER MELANIN HYPERPIGMENTATION: ICD-10-CM

## 2024-02-19 DIAGNOSIS — D485 NEOPLASM OF UNCERTAIN BEHAVIOR OF SKIN: ICD-10-CM

## 2024-02-19 DIAGNOSIS — L57.8 OTHER SKIN CHANGES DUE TO CHRONIC EXPOSURE TO NONIONIZING RADIATION: ICD-10-CM

## 2024-02-19 PROBLEM — D48.5 NEOPLASM OF UNCERTAIN BEHAVIOR OF SKIN: Status: ACTIVE | Noted: 2024-02-19

## 2024-02-19 PROCEDURE — OTHER SUNSCREEN RECOMMENDATIONS: OTHER

## 2024-02-19 PROCEDURE — 99213 OFFICE O/P EST LOW 20 MIN: CPT | Mod: 25

## 2024-02-19 PROCEDURE — OTHER BIOPSY BY SHAVE METHOD: OTHER

## 2024-02-19 PROCEDURE — OTHER COUNSELING: OTHER

## 2024-02-19 PROCEDURE — OTHER REASSURANCE: OTHER

## 2024-02-19 PROCEDURE — 11102 TANGNTL BX SKIN SINGLE LES: CPT

## 2024-02-19 ASSESSMENT — LOCATION DETAILED DESCRIPTION DERM
LOCATION DETAILED: PERIUMBILICAL SKIN
LOCATION DETAILED: LEFT MEDIAL BREAST 10-11:00 REGION
LOCATION DETAILED: RIGHT MEDIAL FOREHEAD
LOCATION DETAILED: LEFT INFERIOR UPPER BACK
LOCATION DETAILED: LEFT MEDIAL BREAST 11-12:00 REGION
LOCATION DETAILED: RIGHT UPPER CUTANEOUS LIP

## 2024-02-19 ASSESSMENT — LOCATION SIMPLE DESCRIPTION DERM
LOCATION SIMPLE: ABDOMEN
LOCATION SIMPLE: RIGHT LIP
LOCATION SIMPLE: RIGHT FOREHEAD
LOCATION SIMPLE: LEFT UPPER BACK
LOCATION SIMPLE: LEFT BREAST

## 2024-02-19 ASSESSMENT — LOCATION ZONE DERM
LOCATION ZONE: FACE
LOCATION ZONE: LIP
LOCATION ZONE: TRUNK

## 2024-02-19 NOTE — PROCEDURE: BIOPSY BY SHAVE METHOD

## 2024-02-27 ENCOUNTER — RX ONLY (RX ONLY)
Age: 64
End: 2024-02-27

## 2024-02-27 RX ORDER — METRONIDAZOLE 7.5 MG/G
CREAM TOPICAL BID
Qty: 45 | Refills: 4 | Status: CANCELLED | COMMUNITY
Start: 2024-02-27

## 2024-03-04 ENCOUNTER — APPOINTMENT (OUTPATIENT)
Dept: URBAN - METROPOLITAN AREA CLINIC 203 | Age: 64
Setting detail: DERMATOLOGY
End: 2024-03-06

## 2024-03-04 DIAGNOSIS — L73.8 OTHER SPECIFIED FOLLICULAR DISORDERS: ICD-10-CM

## 2024-03-04 PROCEDURE — 99213 OFFICE O/P EST LOW 20 MIN: CPT

## 2024-03-04 PROCEDURE — OTHER COUNSELING: OTHER

## 2024-03-04 PROCEDURE — OTHER REASSURANCE: OTHER

## 2024-03-04 PROCEDURE — OTHER PRESCRIPTION: OTHER

## 2024-03-04 PROCEDURE — OTHER PRESCRIPTION MEDICATION MANAGEMENT: OTHER

## 2024-03-04 RX ORDER — AZELAIC ACID 0.15 G/G
GEL TOPICAL BID
Qty: 50 | Refills: 3 | Status: ERX | COMMUNITY
Start: 2024-03-04

## 2024-03-04 ASSESSMENT — LOCATION DETAILED DESCRIPTION DERM
LOCATION DETAILED: RIGHT INFERIOR MEDIAL FOREHEAD
LOCATION DETAILED: RIGHT MEDIAL FOREHEAD

## 2024-03-04 ASSESSMENT — LOCATION SIMPLE DESCRIPTION DERM: LOCATION SIMPLE: RIGHT FOREHEAD

## 2024-03-04 ASSESSMENT — LOCATION ZONE DERM: LOCATION ZONE: FACE

## 2024-03-21 ENCOUNTER — RX ONLY (RX ONLY)
Age: 64
End: 2024-03-21

## 2024-03-21 RX ORDER — TRETIONIN 0.5 MG/G
CREAM TOPICAL QHS
Qty: 45 | Refills: 2 | Status: ERX

## 2024-05-20 ENCOUNTER — OFFICE VISIT (OUTPATIENT)
Dept: SURGERY | Facility: CLINIC | Age: 64
End: 2024-05-20
Payer: COMMERCIAL

## 2024-05-20 ENCOUNTER — HOSPITAL ENCOUNTER (OUTPATIENT)
Facility: HOSPITAL | Age: 64
Setting detail: HOSPITAL OUTPATIENT SURGERY
End: 2024-05-20
Attending: PLASTIC SURGERY | Admitting: PLASTIC SURGERY
Payer: COMMERCIAL

## 2024-05-20 VITALS
DIASTOLIC BLOOD PRESSURE: 70 MMHG | HEIGHT: 64 IN | SYSTOLIC BLOOD PRESSURE: 110 MMHG | BODY MASS INDEX: 19.05 KG/M2 | WEIGHT: 111.6 LBS

## 2024-05-20 DIAGNOSIS — L72.0 INCLUSION CYST: Primary | ICD-10-CM

## 2024-05-20 PROCEDURE — 99213 OFFICE O/P EST LOW 20 MIN: CPT | Performed by: PLASTIC SURGERY

## 2024-05-20 PROCEDURE — 3008F BODY MASS INDEX DOCD: CPT | Performed by: PLASTIC SURGERY

## 2024-05-20 RX ORDER — AZELAIC ACID 0.15 G/G
GEL TOPICAL 2 TIMES DAILY
COMMUNITY
Start: 2024-03-05

## 2024-05-20 RX ORDER — TRETINOIN 0.5 MG/G
CREAM TOPICAL NIGHTLY
COMMUNITY
Start: 2024-04-01

## 2024-05-20 ASSESSMENT — ENCOUNTER SYMPTOMS
CONSTITUTIONAL NEGATIVE: 1
CARDIOVASCULAR NEGATIVE: 1

## 2024-05-20 NOTE — ASSESSMENT & PLAN NOTE
Patient with a right forehead cyst who was sent by Dr. Smith for excision given the longstanding nature of the cyst and recent change.  Risks and benefits of an excision explained to the patient in detail.  Multiple questions asked and answered.  Form consent obtained.

## 2024-05-20 NOTE — PROGRESS NOTES
DETAILS OF VISIT     Consulting Service:  [facility]     Referring Physician: Angel Magaña Jr., MD    Reason for Consultation:   Chief Complaint   Patient presents with   • Follow-up     Pt was referred by Derm for a cyst on her forehead. Pt states it does get bigger but then it gets smaller. Started out as a large pore and then a lump started. Pt states this has been there for 15 years. Pt had BX done in February.        PCP: Liz Cristina MD   HISTORY OF PRESENT ILLNESS     Elena Palmer is a 63 y.o. female actively treated for right forehead likely cyst.       PAST MEDICAL AND SURGICAL HISTORY     Past Medical History:   Diagnosis Date   • Melanoma in situ of back (CMS/HCC) 03/13/2023       Past Surgical History:   Procedure Laterality Date   • CEREBRAL ANEURYSM REPAIR N/A 03/2018   • CEREBRAL ANGIOGRAM     • SKIN CANCER EXCISION N/A 03/13/2023    MIS left upper back   • SPINAL FUSION N/A 1976    scoliosis          MEDICATIONS     Current Outpatient Medications on File Prior to Visit   Medication Sig Dispense Refill   • azelaic acid 15 % cream Apply topically 2 (two) times a day.     • tretinoin (RETIN-A) 0.05 % cream Apply topically nightly.       No current facility-administered medications on file prior to visit.          ALLERGIES     Azithromycin, Ceclor [cefaclor], and Clindamycin     SOCIAL HISTORY     Social History     Tobacco Use   • Smoking status: Never   • Smokeless tobacco: Never   Vaping Use   • Vaping Use: Never used   Substance Use Topics   • Alcohol use: Yes     Alcohol/week: 3.0 standard drinks of alcohol     Types: 3 Glasses of wine per week     Comment: 3 g;asses of wine a week   • Drug use: Never          REVIEW OF SYSTEMS   Review of Systems    Review of Systems   Constitutional: Negative.    HENT: Negative.     Cardiovascular: Negative.          PHYSICAL EXAMINATION   Visit Vitals  /70 (BP Location: Left upper arm, Patient Position: Sitting)  "  Ht 1.613 m (5' 3.5\")   Wt 50.6 kg (111 lb 9.6 oz)   LMP  (LMP Unknown)   BMI 19.46 kg/m²      Body mass index is 19.46 kg/m².    Physical Exam  Constitutional:       Appearance: Normal appearance.   HENT:      Head: Normocephalic and atraumatic.   Pulmonary:      Effort: Pulmonary effort is normal.   Skin:     General: Skin is warm and dry.      Comments: Right forehead cyst with underlying subcutaneous tissue   Neurological:      Mental Status: She is alert.          LABS   Reviewed the following Labs:    No results found for: \"HGBA1C\"  No results found for: \"CHOL\"  No results found for: \"HDL\"  No results found for: \"LDLCALC\"  No results found for: \"TRIG\"  No results found for: \"CHOLHDL\"   ASSESSMENT AND PLAN   Inclusion cyst  Patient with a right forehead cyst who was sent by Dr. Guzman's for excision given the longstanding nature of the cyst and recent change.  Risks and benefits of an excision explained to the patient in detail.  Multiple questions asked and answered.  Form consent obtained.    Prashant Dias MD  5/20/2024     Thank you very much for allowing us to participate in the care of your patient. Please do not hesitate to call or email if there are any questions.   "

## 2024-11-27 ENCOUNTER — TELEPHONE (OUTPATIENT)
Dept: SURGERY | Facility: CLINIC | Age: 64
End: 2024-11-27
Payer: COMMERCIAL

## 2025-01-17 ENCOUNTER — TELEPHONE (OUTPATIENT)
Dept: SURGERY | Facility: CLINIC | Age: 65
End: 2025-01-17
Payer: COMMERCIAL

## 2025-01-20 ENCOUNTER — PREP FOR CASE (OUTPATIENT)
Dept: SURGERY | Facility: CLINIC | Age: 65
End: 2025-01-20
Payer: COMMERCIAL

## 2025-01-20 ENCOUNTER — HOSPITAL ENCOUNTER (OUTPATIENT)
Facility: HOSPITAL | Age: 65
Setting detail: HOSPITAL OUTPATIENT SURGERY
End: 2025-01-20
Attending: PLASTIC SURGERY | Admitting: PLASTIC SURGERY
Payer: COMMERCIAL

## 2025-01-20 DIAGNOSIS — L72.0 EPITHELIAL INCLUSION CYST: Primary | ICD-10-CM

## 2025-02-06 ENCOUNTER — TELEPHONE (OUTPATIENT)
Dept: SURGERY | Facility: CLINIC | Age: 65
End: 2025-02-06
Payer: COMMERCIAL

## 2025-02-06 NOTE — TELEPHONE ENCOUNTER
Patient calling to cancel her 2/6/25 (today's) procedure due to weather -also left message on [place]'s voice mail. Would also like to keep her scheduled post op appointment on 2/10/25 and make it an office visit to have Dr. Dias look at a lump on chin, then have both scheduled and done at the same time.

## 2025-02-10 ENCOUNTER — OFFICE VISIT (OUTPATIENT)
Dept: SURGERY | Facility: CLINIC | Age: 65
End: 2025-02-10
Payer: COMMERCIAL

## 2025-02-10 ENCOUNTER — TELEPHONE (OUTPATIENT)
Dept: SURGERY | Facility: CLINIC | Age: 65
End: 2025-02-10

## 2025-02-10 VITALS
HEIGHT: 64 IN | OXYGEN SATURATION: 99 % | HEART RATE: 79 BPM | WEIGHT: 111.4 LBS | DIASTOLIC BLOOD PRESSURE: 80 MMHG | TEMPERATURE: 98.6 F | BODY MASS INDEX: 19.02 KG/M2 | SYSTOLIC BLOOD PRESSURE: 114 MMHG

## 2025-02-10 DIAGNOSIS — L72.0 INCLUSION CYST: Primary | ICD-10-CM

## 2025-02-10 PROCEDURE — 3008F BODY MASS INDEX DOCD: CPT | Performed by: PLASTIC SURGERY

## 2025-02-10 PROCEDURE — 99213 OFFICE O/P EST LOW 20 MIN: CPT | Performed by: PLASTIC SURGERY

## 2025-02-10 NOTE — ASSESSMENT & PLAN NOTE
Patient comes back in to reschedule excision forehead cyst.  Risk benefits and excision explained to the patient in detail.  Will proceed with excision.  Refer to dermatology for possible shave biopsy.

## 2025-02-10 NOTE — TELEPHONE ENCOUNTER
Left message to schedule surgery. Informed patient she could communicate thru MyChart or call me after 8am Tuesday 2/11/25.

## 2025-02-10 NOTE — PROGRESS NOTES
DETAILS OF VISIT     Consulting Service:  [facility]     Referring Physician: Liz Cristina MD    Reason for Consultation:   Chief Complaint   Patient presents with    Follow-up     Pt is here to rescheduled her surgery for a cyst on her right forehead. Pt had to cancel due to the weather. Pt would also like to discuss removing a lump that is on her chin as well.        PCP: Liz Cristina MD   HISTORY OF PRESENT ILLNESS     Elena Palmer is a 64 y.o. female actively treated for forehead cyst.       PAST MEDICAL AND SURGICAL HISTORY     Past Medical History:   Diagnosis Date    Melanoma in situ of back (CMS/HCC) 03/13/2023       Past Surgical History   Procedure Laterality Date    Cerebral aneurysm repair N/A 03/2018    Cerebral angiogram      Skin cancer excision N/A 03/13/2023    MIS left upper back    Spinal fusion N/A 1976    scoliosis    Wide local excision left upper back melanoma in situ with 5 mm margins, 41789 Left 3/13/2023    Performed by Prashant Dias MD at  OR          MEDICATIONS     Medications Ordered Prior to Encounter[1]       ALLERGIES     Azithromycin, Ceclor [cefaclor], and Clindamycin     SOCIAL HISTORY     Social History     Tobacco Use    Smoking status: Never    Smokeless tobacco: Never   Vaping Use    Vaping status: Never Used   Substance Use Topics    Alcohol use: Yes     Alcohol/week: 3.0 standard drinks of alcohol     Types: 3 Glasses of wine per week     Comment: 3 glasses of wine a week    Drug use: Never          ASSESSMENT AND PLAN   Inclusion cyst  Patient comes back in to reschedule excision forehead cyst.  Risk benefits and excision explained to the patient in detail.  Will proceed with excision.    Prashant Dias MD  2/10/2025     Thank you very much for allowing us to participate in the care of your patient. Please do not hesitate to call or email if there are any questions.          [1]   Current Outpatient Medications on File  Prior to Visit   Medication Sig Dispense Refill    tretinoin (RETIN-A) 0.05 % cream Apply topically nightly.      azelaic acid 15 % cream Apply topically 2 (two) times a day.       No current facility-administered medications on file prior to visit.

## (undated) DEVICE — NEEDLE DISP HYPO 25GX1-1/2IN

## (undated) DEVICE — DRESSING SPONGE ALL GAUZE 4X4 STER 10PK

## (undated) DEVICE — TIP BOVIE NEEDLE COATED INSULATED

## (undated) DEVICE — PENEVAC1 NONSTICK SMOKE EVAC

## (undated) DEVICE — NEEDLE DISP HYPO 30GX1/2IN

## (undated) DEVICE — GLOVE SZ 7.5 PROTEXIS CLASSIC LATEX

## (undated) DEVICE — CORD BIPOLAR CODMAN DISPOSABLE 10-CS

## (undated) DEVICE — FORCEP ADSON BIPOLAR W/CORD DISPOSABLE

## (undated) DEVICE — SUTURE MONOCRYL 3-0  Y497G

## (undated) DEVICE — GAUZE XEROFORM 1X8 NON-STERILE PACKET

## (undated) DEVICE — UNDERPAD DURASORB 30 X 30

## (undated) DEVICE — PAD GROUND ELECTROSURGICAL W/CORD

## (undated) DEVICE — SOLN IRRIG .9%SOD 500ML

## (undated) DEVICE — BLADE PROTECTED SIZE 15